# Patient Record
Sex: FEMALE | Race: WHITE | NOT HISPANIC OR LATINO
[De-identification: names, ages, dates, MRNs, and addresses within clinical notes are randomized per-mention and may not be internally consistent; named-entity substitution may affect disease eponyms.]

---

## 2018-03-19 ENCOUNTER — RESULT REVIEW (OUTPATIENT)
Age: 63
End: 2018-03-19

## 2019-04-02 ENCOUNTER — APPOINTMENT (OUTPATIENT)
Dept: ELECTROPHYSIOLOGY | Facility: CLINIC | Age: 64
End: 2019-04-02
Payer: COMMERCIAL

## 2019-04-02 VITALS
WEIGHT: 203 LBS | BODY MASS INDEX: 33.82 KG/M2 | OXYGEN SATURATION: 9 % | DIASTOLIC BLOOD PRESSURE: 79 MMHG | HEART RATE: 80 BPM | SYSTOLIC BLOOD PRESSURE: 120 MMHG | HEIGHT: 65 IN

## 2019-04-02 DIAGNOSIS — Z51.81 ENCOUNTER FOR THERAPEUTIC DRUG LVL MONITORING: ICD-10-CM

## 2019-04-02 DIAGNOSIS — Z79.899 ENCOUNTER FOR THERAPEUTIC DRUG LVL MONITORING: ICD-10-CM

## 2019-04-02 PROCEDURE — 93000 ELECTROCARDIOGRAM COMPLETE: CPT

## 2019-04-02 PROCEDURE — 99214 OFFICE O/P EST MOD 30 MIN: CPT

## 2019-04-02 NOTE — PHYSICAL EXAM
[General Appearance - Well Developed] : well developed [Normal Appearance] : normal appearance [Well Groomed] : well groomed [General Appearance - Well Nourished] : well nourished [No Deformities] : no deformities [General Appearance - In No Acute Distress] : no acute distress [Normal Conjunctiva] : the conjunctiva exhibited no abnormalities [Eyelids - No Xanthelasma] : the eyelids demonstrated no xanthelasmas [Normal Oral Mucosa] : normal oral mucosa [No Oral Pallor] : no oral pallor [No Oral Cyanosis] : no oral cyanosis [Normal Jugular Venous A Waves Present] : normal jugular venous A waves present [Normal Jugular Venous V Waves Present] : normal jugular venous V waves present [No Jugular Venous Son A Waves] : no jugular venous son A waves [Heart Rate And Rhythm] : heart rate and rhythm were normal [Heart Sounds] : normal S1 and S2 [Murmurs] : no murmurs present [Respiration, Rhythm And Depth] : normal respiratory rhythm and effort [Exaggerated Use Of Accessory Muscles For Inspiration] : no accessory muscle use [Auscultation Breath Sounds / Voice Sounds] : lungs were clear to auscultation bilaterally [Abdomen Soft] : soft [Abdomen Tenderness] : non-tender [Abdomen Mass (___ Cm)] : no abdominal mass palpated [Abnormal Walk] : normal gait [Gait - Sufficient For Exercise Testing] : the gait was sufficient for exercise testing [Nail Clubbing] : no clubbing of the fingernails [Cyanosis, Localized] : no localized cyanosis [Petechial Hemorrhages (___cm)] : no petechial hemorrhages [Skin Color & Pigmentation] : normal skin color and pigmentation [] : no rash [No Venous Stasis] : no venous stasis [Skin Lesions] : no skin lesions [No Skin Ulcers] : no skin ulcer [No Xanthoma] : no  xanthoma was observed [Oriented To Time, Place, And Person] : oriented to person, place, and time [Affect] : the affect was normal [Mood] : the mood was normal [No Anxiety] : not feeling anxious

## 2019-04-03 ENCOUNTER — NON-APPOINTMENT (OUTPATIENT)
Age: 64
End: 2019-04-03

## 2019-04-03 NOTE — DISCUSSION/SUMMARY
[FreeTextEntry1] : In summary, Lucina Martin is a 64y/o woman with Hx of sustained VT s/p ICD implant (2000) and maintained on flecainide and metoprolol who presents today for initial evaluation. Admits doing well with no issues or complaints. Denies chest pain, palpitations, SOB, syncope or near syncope. No recent shocks from ICD. Last ICD shock approximately in 2009 prior to flecainide use. Has had 3 shocks in her lifetime total. EKG today NSR. Inquiring about possible discontinuation of flecainide. May discontinue flecainide but would resume metoprolol 50 mg daily. Will continue regular device checks and if episodes of VT recur, will reinitiate flecainide as previously prescribed or consider ablation. Patient understands risks and benefits of discontinuing flecainide. \par \par Sincerely,\par \par Cristhian Avila MD

## 2019-04-03 NOTE — HISTORY OF PRESENT ILLNESS
[FreeTextEntry1] : Bishnu Hale MD\par \par I saw Lucina Martin on April 2, 2019. As you know, she is a 64y/o woman with Hx of sustained VT s/p ICD implant (2000) and maintained on flecainide and metoprolol who presents today for initial evaluation. Admits doing well with no issues or complaints. Denies chest pain, palpitations, SOB, syncope or near syncope. No recent shocks from ICD. Last ICD shock approx in 2009 prior to flecainide use. Has had 3 shocks in her lifetime total.

## 2021-06-08 ENCOUNTER — APPOINTMENT (OUTPATIENT)
Dept: ELECTROPHYSIOLOGY | Facility: CLINIC | Age: 66
End: 2021-06-08
Payer: COMMERCIAL

## 2021-06-08 ENCOUNTER — NON-APPOINTMENT (OUTPATIENT)
Age: 66
End: 2021-06-08

## 2021-06-08 VITALS — BODY MASS INDEX: 34.95 KG/M2 | WEIGHT: 210 LBS

## 2021-06-08 VITALS
HEART RATE: 86 BPM | DIASTOLIC BLOOD PRESSURE: 85 MMHG | HEIGHT: 65 IN | SYSTOLIC BLOOD PRESSURE: 119 MMHG | BODY MASS INDEX: 34.95 KG/M2 | TEMPERATURE: 98.7 F | OXYGEN SATURATION: 96 %

## 2021-06-08 DIAGNOSIS — Z95.810 PRESENCE OF AUTOMATIC (IMPLANTABLE) CARDIAC DEFIBRILLATOR: ICD-10-CM

## 2021-06-08 PROCEDURE — 99213 OFFICE O/P EST LOW 20 MIN: CPT

## 2021-06-08 PROCEDURE — 93000 ELECTROCARDIOGRAM COMPLETE: CPT

## 2021-06-08 PROCEDURE — 99072 ADDL SUPL MATRL&STAF TM PHE: CPT

## 2021-06-08 NOTE — HISTORY OF PRESENT ILLNESS
[FreeTextEntry1] : Lucina Rich is a 65y/o woman with Hx of sustained VT s/p ICD implant (2000) and previously on flecainide and maintained on metoprolol who presents today for routine f/u. Admits doing well with no issues or complaints. Denies chest pain, palpitations, SOB, syncope or near syncope. No ICD shocks. ICD checked by [employer] at Cardiologist office, last check 4/2021. Nearing KAROL. \par \par Of note: Last ICD shock approximately in 2009 prior to flecainide use. Has had 3 shocks in her lifetime total. Flecainide was discontinued on last visit back in 4/2019.

## 2021-06-08 NOTE — DISCUSSION/SUMMARY
[FreeTextEntry1] : Impression:\par \par 1. VT: s/p ICD placement. EKG performed today to assess for presence of conduction disease and appropriate pacing and reveals NSR. Quick check of ICD reveals no events for review. No VT noted. Brief runs of SVT. No ICD shocks. Approximately 4 mo until KAROL reached. Recommend undergoing routine ICD gen change once KAROL has been reached. Risks, benefits, and alternatives discussed. A thorough discussion was had with the patient concerning all aspects of ICD therapy. We reviewed the data supporting ICD therapy and how it applies individually.  We discussed the procedures, risks and outcomes of ICD implantation an living with an ICD. We discussed management of ICD therapy throughout life, including deactivation of the ICD. After all questions were answered, and literature was provided, it was a shared decision to proceed with ICD therapy when indicated. May take all other medication with a small sip of water. Will f/u with Cardiologist for routine ICD checks and call office to schedule gen change once KAROL is reached. \par \par Will continue f/u with Cardiologist and may RTO as needed or if any new or worsening symptoms or findings occur.

## 2021-08-17 ENCOUNTER — APPOINTMENT (OUTPATIENT)
Dept: ELECTROPHYSIOLOGY | Facility: CLINIC | Age: 66
End: 2021-08-17
Payer: COMMERCIAL

## 2021-08-17 VITALS — RESPIRATION RATE: 14 BRPM | DIASTOLIC BLOOD PRESSURE: 90 MMHG | SYSTOLIC BLOOD PRESSURE: 141 MMHG | HEART RATE: 85 BPM

## 2021-08-17 VITALS
OXYGEN SATURATION: 96 % | WEIGHT: 210 LBS | DIASTOLIC BLOOD PRESSURE: 84 MMHG | SYSTOLIC BLOOD PRESSURE: 133 MMHG | RESPIRATION RATE: 14 BRPM | BODY MASS INDEX: 34.99 KG/M2 | TEMPERATURE: 96.3 F | HEIGHT: 65 IN | HEART RATE: 67 BPM

## 2021-08-17 DIAGNOSIS — M19.90 UNSPECIFIED OSTEOARTHRITIS, UNSPECIFIED SITE: ICD-10-CM

## 2021-08-17 PROCEDURE — 99213 OFFICE O/P EST LOW 20 MIN: CPT

## 2021-08-17 PROCEDURE — 93283 PRGRMG EVAL IMPLANTABLE DFB: CPT

## 2021-08-17 RX ORDER — CYCLOBENZAPRINE HYDROCHLORIDE 5 MG/1
5 TABLET, FILM COATED ORAL
Refills: 0 | Status: ACTIVE | COMMUNITY

## 2021-08-17 RX ORDER — DICLOFENAC SODIUM 100 MG/1
100 TABLET, FILM COATED, EXTENDED RELEASE ORAL TWICE DAILY
Refills: 0 | Status: ACTIVE | COMMUNITY

## 2021-08-26 ENCOUNTER — TRANSCRIPTION ENCOUNTER (OUTPATIENT)
Age: 66
End: 2021-08-26

## 2021-09-14 NOTE — HISTORY OF PRESENT ILLNESS
[FreeTextEntry1] : Bishnu Hale MD\par \par I saw Lucina Martin on August 17, 2021. As you know, she is a 65y/o woman with Hx of sustained VT s/p ICD implant (2000) and maintained on flecainide and metoprolol who returns today because her ICD is approaching KAROL. Admits doing well with no issues or complaints. Denies chest pain, palpitations, SOB, syncope or near syncope. No recent shocks from ICD. Last ICD shock approximately in 2009 prior to flecainide use. Has had 3 shocks in her lifetime total. ICD was emitting beep tones. But the ICD is not quite at KAROL.

## 2021-09-14 NOTE — DISCUSSION/SUMMARY
[FreeTextEntry1] : I saw Lucina Martin on August 17, 2021. As you know, she is a 65y/o woman with Hx of sustained VT s/p ICD implant (2000) and maintained on flecainide and metoprolol who returns today because her ICD is approaching KAROL. Admits doing well with no issues or complaints. Denies chest pain, palpitations, SOB, syncope or near syncope. No recent shocks from ICD. Last ICD shock approximately in 2009 prior to flecainide use. Has had 3 shocks in her lifetime total. ICD was emitting beep tones. But the ICD is not quite at KAROL.  ECG performed to check VT and need for CRT. \par \par 1. ICD close to KAROL. Will wait until full KAROL prior to ICD generator change. \par \par 2. History of VT; will continue metoprolol for now. \par \par Sincerely,\par \par Cristhian Avila MD

## 2021-10-08 ENCOUNTER — APPOINTMENT (OUTPATIENT)
Dept: DISASTER EMERGENCY | Facility: CLINIC | Age: 66
End: 2021-10-08

## 2021-10-08 DIAGNOSIS — Z01.818 ENCOUNTER FOR OTHER PREPROCEDURAL EXAMINATION: ICD-10-CM

## 2021-10-09 LAB — SARS-COV-2 N GENE NPH QL NAA+PROBE: NOT DETECTED

## 2021-10-11 ENCOUNTER — OUTPATIENT (OUTPATIENT)
Dept: OUTPATIENT SERVICES | Facility: HOSPITAL | Age: 66
LOS: 1 days | Discharge: HOME | End: 2021-10-11
Payer: COMMERCIAL

## 2021-10-11 DIAGNOSIS — I47.2 VENTRICULAR TACHYCARDIA: ICD-10-CM

## 2021-10-11 LAB
ANION GAP SERPL CALC-SCNC: 13 MMOL/L — SIGNIFICANT CHANGE UP (ref 7–14)
BUN SERPL-MCNC: 23 MG/DL — SIGNIFICANT CHANGE UP (ref 7–23)
CALCIUM SERPL-MCNC: 9.3 MG/DL — SIGNIFICANT CHANGE UP (ref 8.4–10.5)
CHLORIDE SERPL-SCNC: 104 MMOL/L — SIGNIFICANT CHANGE UP (ref 98–107)
CO2 SERPL-SCNC: 25 MMOL/L — SIGNIFICANT CHANGE UP (ref 22–31)
CREAT SERPL-MCNC: 0.99 MG/DL — SIGNIFICANT CHANGE UP (ref 0.5–1.3)
GLUCOSE SERPL-MCNC: 102 MG/DL — HIGH (ref 70–99)
HCT VFR BLD CALC: 44.4 % — SIGNIFICANT CHANGE UP (ref 34.5–45)
HGB BLD-MCNC: 14.8 G/DL — SIGNIFICANT CHANGE UP (ref 11.5–15.5)
MCHC RBC-ENTMCNC: 28.4 PG — SIGNIFICANT CHANGE UP (ref 27–34)
MCHC RBC-ENTMCNC: 33.3 GM/DL — SIGNIFICANT CHANGE UP (ref 32–36)
MCV RBC AUTO: 85.2 FL — SIGNIFICANT CHANGE UP (ref 80–100)
NRBC # BLD: 0 /100 WBCS — SIGNIFICANT CHANGE UP
NRBC # FLD: 0 K/UL — SIGNIFICANT CHANGE UP
PLATELET # BLD AUTO: 316 K/UL — SIGNIFICANT CHANGE UP (ref 150–400)
POTASSIUM SERPL-MCNC: 4.1 MMOL/L — SIGNIFICANT CHANGE UP (ref 3.5–5.3)
POTASSIUM SERPL-SCNC: 4.1 MMOL/L — SIGNIFICANT CHANGE UP (ref 3.5–5.3)
RBC # BLD: 5.21 M/UL — HIGH (ref 3.8–5.2)
RBC # FLD: 13.7 % — SIGNIFICANT CHANGE UP (ref 10.3–14.5)
SODIUM SERPL-SCNC: 142 MMOL/L — SIGNIFICANT CHANGE UP (ref 135–145)
WBC # BLD: 7.91 K/UL — SIGNIFICANT CHANGE UP (ref 3.8–10.5)
WBC # FLD AUTO: 7.91 K/UL — SIGNIFICANT CHANGE UP (ref 3.8–10.5)

## 2021-10-11 PROCEDURE — 33264 RMVL & RPLCMT DFB GEN MLT LD: CPT

## 2021-10-11 PROCEDURE — 93010 ELECTROCARDIOGRAM REPORT: CPT | Mod: 76

## 2021-10-11 RX ORDER — CYCLOBENZAPRINE HYDROCHLORIDE 10 MG/1
1 TABLET, FILM COATED ORAL
Qty: 0 | Refills: 0 | DISCHARGE

## 2021-10-11 RX ORDER — FAMOTIDINE 10 MG/ML
1 INJECTION INTRAVENOUS
Qty: 0 | Refills: 0 | DISCHARGE

## 2021-10-11 RX ORDER — DICLOFENAC SODIUM 75 MG/1
1 TABLET, DELAYED RELEASE ORAL
Qty: 0 | Refills: 0 | DISCHARGE

## 2021-10-11 RX ORDER — SODIUM CHLORIDE 9 MG/ML
3 INJECTION INTRAMUSCULAR; INTRAVENOUS; SUBCUTANEOUS EVERY 8 HOURS
Refills: 0 | Status: DISCONTINUED | OUTPATIENT
Start: 2021-10-11 | End: 2021-10-26

## 2021-10-11 RX ORDER — ROSUVASTATIN CALCIUM 5 MG/1
1 TABLET ORAL
Qty: 0 | Refills: 0 | DISCHARGE

## 2021-10-11 RX ORDER — METOPROLOL TARTRATE 50 MG
1 TABLET ORAL
Qty: 0 | Refills: 0 | DISCHARGE

## 2021-10-11 NOTE — H&P CARDIOLOGY - HISTORY OF PRESENT ILLNESS
66 y.o female with  a PMH of  HLD, GERD, VT, s/p AICD implant in 2000 presented today for gen change. AICD reaching KAROL.   The process of the procedures along with the risk and benefits for the procedure were explained in detail which included but not limited to bleeding, infection, stroke, cardiac tamponade, intubation, and death. Patient expressed understanding an all questions were answered.  Patient denies chest pain, SOB, palpitations, dizziness, presyncope, syncope,  headache, visual disturbances, CVA, PE, DVT, IRMA, abdominal pain, N/V/D/C, hematochezia, melena, dysuria, hematuria, fever, chills.

## 2021-10-11 NOTE — H&P CARDIOLOGY - NSICDXPASTMEDICALHX_GEN_ALL_CORE_FT
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

## 2021-10-11 NOTE — CHART NOTE - NSCHARTNOTEFT_GEN_A_CORE
Type of Procedure: Dual Chamber Implantable Cardioverter Defibrillator Generator Change  Licensed independent practitioner: Cristhian Avila MD  Assistant: none  Description of procedure: sterile conditions, antibiotic coverage, old ICD removed, pocket copiously irrigated with antibiotic solution, new ICD implanted and pocket closed in 3 layers.  A technical representative was present to help operate a sophisticated .   Findings of procedure: normal pacing, sensing and lead integrity  Estimated blood loss: < 10 cc  Specimen removed: old battery depleted ICD  Preoperative Dx: Sustained ventricular tachycardia; ICD at KAROL  Postoperative Dx: Sustained ventricular tachycardia;  ICD at KAROL; ICD replaced  Complications: none  Anesthesia type: local anesthesia with sedation  No heparin for 24 hours and then reassess.  NYHA class I    Cristhian Avila MD.
ELECTROPHYSIOLOGY      Patient s/p ICD generator change. Tolerated the procedure well. No complications.   Vital signs stable. Telemetry normal sinus rhythm. Oxygen saturation 90-95% room air.   Dressing dry and intact. Instructed to remove it in 24 hours.   Post procedure ICD teaching done. Written instructions and contact information provided.   Patient given a home monitor with verbal and written instructions.   She would prefer to follow-up with her cardiologist in 2 weeks. Patient lives in [facility].   Plan:  Patient to use incentive spirometry until O2 sats increase to at least 95%.           She will call our secretaries tomorrow to cancel her appointment in the device clinic.

## 2021-10-11 NOTE — H&P CARDIOLOGY - NSICDXPASTSURGICALHX_GEN_ALL_CORE_FT
PAST SURGICAL HISTORY:  AICD (Automatic Cardioverter/D AICD 1st placed in     AICD at End of Battery Life 1st generator change-     Encounter due to AICD at End o 2 months until end of battery life  AICD KAROL    S/P Cholecystectomy removal ; [place]    S/P Emergency  - [facility]; Son dx- SVT

## 2021-10-11 NOTE — H&P CARDIOLOGY - REVIEW OF SYSTEMS
Patient denies chest pain, SOB, palpitations, dizziness, presyncope, syncope,  headache, visual disturbances, CVA, PE, DVT, IRMA, abdominal pain, N/V/D/C, hematochezia, melena, dysuria, hematuria, fever, chills.

## 2021-10-12 ENCOUNTER — NON-APPOINTMENT (OUTPATIENT)
Age: 66
End: 2021-10-12

## 2021-10-21 ENCOUNTER — NON-APPOINTMENT (OUTPATIENT)
Age: 66
End: 2021-10-21

## 2021-10-26 ENCOUNTER — APPOINTMENT (OUTPATIENT)
Dept: ELECTROPHYSIOLOGY | Facility: CLINIC | Age: 66
End: 2021-10-26
Payer: COMMERCIAL

## 2021-10-26 DIAGNOSIS — I47.2 VENTRICULAR TACHYCARDIA: ICD-10-CM

## 2021-10-26 PROCEDURE — 99024 POSTOP FOLLOW-UP VISIT: CPT
